# Patient Record
Sex: FEMALE | Race: WHITE
[De-identification: names, ages, dates, MRNs, and addresses within clinical notes are randomized per-mention and may not be internally consistent; named-entity substitution may affect disease eponyms.]

---

## 2020-06-28 ENCOUNTER — HOSPITAL ENCOUNTER (EMERGENCY)
Dept: HOSPITAL 46 - ED | Age: 25
Discharge: HOME | End: 2020-06-28
Payer: COMMERCIAL

## 2020-06-28 VITALS — BODY MASS INDEX: 18.33 KG/M2 | WEIGHT: 110.01 LBS | HEIGHT: 65 IN

## 2020-06-28 DIAGNOSIS — J45.909: ICD-10-CM

## 2020-06-28 DIAGNOSIS — F17.200: ICD-10-CM

## 2020-06-28 DIAGNOSIS — N39.0: Primary | ICD-10-CM

## 2020-06-28 DIAGNOSIS — Z91.018: ICD-10-CM

## 2020-06-30 ENCOUNTER — HOSPITAL ENCOUNTER (OUTPATIENT)
Dept: HOSPITAL 46 - ED | Age: 25
Setting detail: OBSERVATION
LOS: 2 days | Discharge: HOME | End: 2020-07-02
Attending: SURGERY | Admitting: SURGERY
Payer: COMMERCIAL

## 2020-06-30 VITALS — BODY MASS INDEX: 22.91 KG/M2 | WEIGHT: 137.5 LBS | HEIGHT: 65 IN

## 2020-06-30 DIAGNOSIS — F17.210: ICD-10-CM

## 2020-06-30 DIAGNOSIS — K35.33: Primary | ICD-10-CM

## 2020-06-30 DIAGNOSIS — Z91.018: ICD-10-CM

## 2020-06-30 PROCEDURE — U0002 COVID-19 LAB TEST NON-CDC: HCPCS

## 2020-06-30 PROCEDURE — G0378 HOSPITAL OBSERVATION PER HR: HCPCS

## 2020-06-30 PROCEDURE — C9803 HOPD COVID-19 SPEC COLLECT: HCPCS

## 2020-06-30 NOTE — XMS
PreManage Notification: LITTLE KING MRN:[id number]
 
Security Information
 
Security Events
No recent Security Events currently on file
 
 
 
CRITERIA MET
------------
- [facility] - 2 Visits in 30 Days
 
 
CARE PROVIDERS
There are no care providers on record at this time.
 
Reed has no Care Guidelines for this patient.
 
AUGUSTINA VISIT COUNT (12 MO.)
-------------------------------------------------------------------------------------
2 [facility]
-------------------------------------------------------------------------------------
TOTAL 2
-------------------------------------------------------------------------------------
NOTE: Visits indicate total known visits.
 
ED/C VISIT TRACKING (12 MO.)
-------------------------------------------------------------------------------------
06/30/2020 22:27
[facility]
 
TYPE: Emergency
 
COMPLAINT:
- ABD PAIN
-------------------------------------------------------------------------------------
06/28/2020 01:25
[facility]
 
TYPE: Emergency
 
COMPLAINT:
- ABD PAIN
-------------------------------------------------------------------------------------
 
 
INPATIENT VISIT TRACKING (12 MO.)
No inpatient visits to display in this time frame
 
[email or web address]

## 2020-07-01 PROCEDURE — 0DTJ4ZZ RESECTION OF APPENDIX, PERCUTANEOUS ENDOSCOPIC APPROACH: ICD-10-PCS | Performed by: SURGERY

## 2020-07-01 NOTE — NUR
IN ROOM FOR REPORT, PT IN 8/10 PAIN. ADMINISTERING 1.5MG IV DILAUDID. PT
DENIES FURTHER NEEDS. CALL LIGHT IS CLOSE.

## 2020-07-01 NOTE — NUR
HELPED PT TO THE BATHROOM AND BACK TO BED. SCD'S PUT ON AND TURNED ON. PT
ASKED FOR SOMETHING TO EAT. I GAVE HER SOME PUDDING. SHE ALSO ASKED FOR PAIN
MEDS. I INFORMED HER RN GERRI. PT NEEDS NOTHING MORE AT THIS TIME.

## 2020-07-01 NOTE — NUR
PT ARRIVED TO THE FLOOR VIA STRETCHER. SHE WAS PULLED OVER TO BED 3PA. SHE IS
TUCKED INTO BED VS TAKEN. ELIAS IS IN THE ROOM DOING THE ADMISSION HX.

## 2020-07-01 NOTE — NUR
PT STATES PAIN IS 9/10 IN ABD. PRN PAIN MED PROVIDED. NO OTHER NEEDS AT THIS
TIME. CPOX 97% RA, PULSE 53. IV FLUIDS INFSUING PER ORDER. CALL LIGHT IN
REACH.

## 2020-07-01 NOTE — NUR
PT IS RESTING WITH EYES CLOSED, RR IS EVEN AND NONLABORED. IV IS INFUSING
FINE. CALL LIGHT IS CLOSE.

## 2020-07-01 NOTE — NUR
PT APPEARS TO BE RESTING AT THIS TIME COMFORTABLY. RESPIRATIONS NOTED AND PT
IS ON CPOX AT THIS TIME

## 2020-07-01 NOTE — NUR
PT HAVING EMESIS AND RATING PAIN 8/10. CALLED DR KAUR GOT NEW ORDERS TORBC
FOR DILAUDID/PHENERGAN. NO FURTHER CHANGES.

## 2020-07-01 NOTE — NUR
PT IV IS A FIELD START THAT IS  BUT FLUSHED WELL, CDI AND WNL.  CALLED
MD ON PHONE TO INFORM HIM SHE HAS HAD 6 IV ATTEMPTS IN THE LAST DAY TO TRY TO
REPLACE THE  IV WITHOUT SUCCESS AND ASK IF IT IS OK TO LEAVE AND USE
THE IV IN PLACE. MD AUTHORIZED LEAVING THE  FIELD START AND USE THE IV
AS NEEDED. ORDER REPEATED BACK.

## 2020-07-01 NOTE — NUR
SHIFT REPORT RECEIVED FROM FRANK SALAS. PT RESTING IN BED, EYES CLOSED. CALL
LIGHT IN REACH. IV FLUIDS INFUSING PER ORDER.

## 2020-07-01 NOTE — OR
[facility]
                                    [address], [place]  [address]
_________________________________________________________________________________________
                                                                 Signed   
 
 
DATE OF OPERATION:
07/01/2020
 
SURGEON:
Jelena Garcia MD
 
PREOPERATIVE DIAGNOSIS:
Acute appendicitis.
 
POSTOPERATIVE DIAGNOSIS:
Acute inflamed appendicitis.
 
PROCEDURE:
Laparoscopic appendectomy.
 
ESTIMATED BLOOD LOSS:
None.
 
INDICATIONS:
Ramandeep is a 25-year-old young lady, who is otherwise generally healthy.  She had
developed right lower quadrant abdominal pain and nausea.  She had been in the emergency
room a couple of days ago and initially, her evaluation including a CT scan was
negative, but that pain became more localized to the right lower quadrant.  She also had
a urinary tract infection.  Unfortunately, she did not stop by the pharmacy to 
her Macrobid.  She returns now with more localized right lower quadrant abdominal pain.
Repeat CT scan showed an inflamed appendix.  Of course, the urinalysis still contains
bacteria and nitrites.  The original culture from two days ago grew out E. coli.  It is
sensitive to the Macrobid.  It is also sensitive to the Rocephin that we gave her in the
emergency room.  In addition, she did receive some Flagyl.  I have been asked to admit
her as a general surgeon on-call.  She was hydrated overnight and this morning I had
reviewed her findings with her in detail.  We discussed the location of function of the
appendix.  We discussed laparoscopic versus an open appendectomy.  She understands
expected intraop and postop course.  There is risk including, but not limited to
bleeding, infection, scarring, change in contour of the skin, damage to bowel,
appendiceal stump leak, postoperative intraabdominal abscess, incisional hernias and
other unforeseen comorbidities.  She had expressed understanding and wished to proceed. 
 
DESCRIPTION OF PROCEDURE:
Ramandeep was taken in the operating room and placed in the supine position under general
endotracheal tube anesthesia.  She was given preoperative antibiotics along with
subcutaneous heparin.  SCDs were utilized.  She had urinated just prior to going to the
operating room.  Consequently, we did not place a Mendez catheter.  She was prepped and
 
    Electronically Signed By: JELENA GARCIA MD  07/01/20 1301
_________________________________________________________________________________________
PATIENT NAME:     RAMANDEEP KING                       
MEDICAL RECORD #: [id number]            OPERATIVE REPORT              
          ACCT #: [id number]  
DATE OF BIRTH:   [birth year]            REPORT #: [id number]      
PHYSICIAN:        JELENA GARCIA MD             
PCP:              OTHER PCP                     
REPORT IS CONFIDENTIAL AND NOT TO BE RELEASED WITHOUT AUTHORIZATION
 
 
                                  [address]  [address]
_________________________________________________________________________________________
                                                                 Signed   
 
 
draped in the usual sterile fashion.  All trocars were placed in usual positions under
direct visualization of camera without difficulty.  Pictures were taken throughout for
photodocumentation.  The appendix was elevated and divided from the base of the cecum
with the help of the linear stapler.  The vascular load was then used to divide the
mesoappendix.  Hemostasis was excellent on both staple lines.  The appendix was then
placed into an EndoCatch bag and taken out through the right subcostal trocar site.  We
used our laparoscopic suturing device to pass 0 Vicryl suture on either side of the
fascia of the subxiphoid trocar site.  This was closed __________ fascia primarily.
After this, all the gas was allowed to escape and all the remaining trocars were
removed.  We closed the fascia of the supraumbilical trocar site with interrupted
figure-of-eight and simple 0 Vicryl sutures.  Local anesthetic was copiously injected
into all 3 trocar sites.  Each trocar site was irrigated and suctioned out until clear.
The skin and dermis of each trocar site were closed with interrupted 2-0 subcuticular
Monocryl sutures.  Dry gauze and tape were then applied to all incisions.  Ramandeep was
awakened from her anesthesia, extubated in the OR, and taken to the recovery room in
stable condition. 
 
 
 
            ________________________________________
            Jelena Garcia MD 
 
 
ALB/MODL
Job #:  [id number]
DD:  07/01/2020 10:40:29
DT:  07/01/2020 11:39:36
 
cc:            Jelena Garcia MD
 
 
Copies:  JELENA GARCIA MD
~
 
 
 
 
 
 
 
 
 
 
    Electronically Signed By: JELENA GARCIA MD  07/01/20 1301
_________________________________________________________________________________________
PATIENT NAME:     RAMANDEEP KING                       
MEDICAL RECORD #: [id number]            OPERATIVE REPORT              
          ACCT #: [id number]  
DATE OF BIRTH:   [birth year]            REPORT #: [id number]      
PHYSICIAN:        JELENA GARCIA MD             
PCP:              OTHER PCP                     
REPORT IS CONFIDENTIAL AND NOT TO BE RELEASED WITHOUT AUTHORIZATION

## 2020-07-01 NOTE — NUR
PT IS RESTING WITH EYES CLOSED, RR IS EVEN AND NONLABORED ON RA AT 98% SPO2.
IV IS INFUSING FINE. CALL LIGHT IS CLOSE.

## 2020-07-01 NOTE — NUR
RECEIVED REPORT FROM ANTON SALAS. PT HAD NOTIFIED LUCILA KEYS THAT SHE WAS IN
PAIN. SONIA RN AND CELESTE SALAS IN ROOM TO GIVE PT 1.5MG OF DILAUDID. SINTA CNA
IN ROOM TO ASSIST PT TO HAVE WIPE DOWN.
RADHA IN ROOM TO ASSIST PT TO SIGN UP FOR INSURANCE

## 2020-07-01 NOTE — NUR
IN ROOM TO ADMINISTER DILAUDID FOR PAIN 8/10. REVIEWED PREOP CHECKLIST WITH
PT. WILL TRY TO START A 2ND IV, PT STATES THEY USUALLY HAVE A HARD TIME
STARTING IV'S ON HER.

## 2020-07-01 NOTE — NUR
PATIENT CURLED UP ON LEFT SIDE IN BED. VITALS CHARTED BY JOSUE MARIE. I&OS DONE.
GARBAGE EMPTIED. CALL LIGHT IN REACH

## 2020-07-01 NOTE — NUR
pt arrived to the floor at 1130. pt has 3 lap sites that are clean dry and
intact. pt does not complain of pain and is slightly drowsy but arousable.
call light is within reach and pt was placed on cpox, pt o2 sat at 95% on room
air

## 2020-07-01 NOTE — NUR
07/01/20 1058 Nga Adame 1038 PT ARRIVED IN PACU SLEEPY WITH NO C/O'S. ICE TO ABD. 1045 PT
REPOSITIONED SELF TO L SIDE. SHIVERING. WARM BLANKETS PLACED ON PT.

## 2020-07-01 NOTE — NUR
PT WAS ADMITTED 0105 TODAY WITH HOLDING ORDERS FROM E.R. PT WAS IN ALOT OF
PAIN, CALLED DR KAUR FOR MORE PAIN CONTROL AND NAUSEA MEDS. PT SINCE HAD A
DOSE OF DILADID AND HAS SLEPT WELL SINCE THEN. SHE WILL BE EVALUATED BY DR KAUR TODAY FOR SURGERY. SHE IS NPO AND HAS LR RUNNING AT 125MLS/HR.

## 2020-07-01 NOTE — NUR
1200 PT PUT ON CALL LIGHT TO LET STAFF KNOW THAT SHE WAS IN A LOT OF PAIN.
THIS RN ASSISTED PT TO COMMODE. PT ABLE TO URINATE AND GET BACK TO BED WITH
BRENNA GRIMACING FROM PAIN. THIS RN DISCUSSED WITH PT WHAT MEDS SHE HAS
AVAILABLE.
THIS RN WENT TO GRAB PT PAIN MEDS. PT BACK TO SLEEP WITH RESPIRATIONS NOTED.
THIS RN ABLE TO TAKE PTS BP AND START FLUIDS ON PT WITHOUT WAKING PT.
THIS RN WILL RETURN TO ROOM TO GIVE MEDS WHEN PT WAKES UP AGAIN AND REQUESTS
PAIN MEDS AGAIN I

## 2020-07-01 NOTE — NUR
pt USED CALL LIGHT TO ASK FOR ASSISTANCE INTO RESTROOM. VS AND I&OS COMPLETE.
NOTHING FURTHER NEEDED AT THIS TIME.

## 2020-07-01 NOTE — NUR
ASSESSMENT, VS AND I&O COMPLETED. PT PAIN 10/10 PRN PAIN MED PROVIDED. 3 LAP
SITES CDI, WNL, COVERED WITH GAUZE. BOWEL TONES ACTIVE. PT TOLERATING LIQUIDS.
LUNGS CLEAR. IV WNL, CDI, FLUSHED MWELL. IV FLUIDS INFUSING PER ORDER. CMS
INTACT. CPOX 96% RA, PULSE 51. NO OTHER NEEDS AT THIS TIME. CALL LIGHT IN RE
REACH.

## 2020-07-01 NOTE — CONS
[facility]
                                    [address], [place]  [address]
_________________________________________________________________________________________
                                                                 Signed   
 
 
DATE OF CONSULTATION:
07/01/2020
 
REFERRING PHYSICIAN:
Dr. Masoud Harry.
 
CHIEF COMPLAINT:
Right lower quadrant abdominal pain.
 
HISTORY OF PRESENT ILLNESS:
Ramandeep is a 25-year-old female, otherwise healthy, who tells me she outgrew her
childhood asthma.  She came to our emergency room a couple of days ago with abdominal
pain.  This workup was negative including a CT scan.  There was concern for urinary
tract infection.  Macrobid was sent into local [facility] pharmacy.  She and her boyfriend
never picked up the prescription.  She is currently unemployed I believe and she told me
her boyfriend is also unemployed.  She told me she is originally from [place], [place]
but had lived in [place], [place] for a while with her grandfather, Jermaine Fontaine.  She
told me she does drive.  She also has two older siblings.  Her abdominal pain has gotten
worse and it is now localized to the right lower quadrant.  She came back to the
emergency room for evaluation.  White count was elevated and her urine specific gravity
was elevated with bacteria and nitrates in the urine.  Beta-hCG was negative.  Urine
culture still pending.  A repeat CT scan showed an inflamed appendix.  Consequently, she
was admitted overnight and started on Rocephin and Flagyl on IV fluids.  This morning,
she was sleeping, but easily awaken. 
 
PAST MEDICAL HISTORY:
Ovarian cyst and asthma as a child.
 
PAST SURGICAL HISTORY:
None.
 
SOCIAL HISTORY:
She smokes a pack of cigarettes a day.  She does not drink.  Apparently lives with her
boyfriend.  She drives, but is unemployed and her boyfriend is unemployed.  Her
grandfather is Jermaine Fontaine at [phone] in [place], [place].  She told me she is
estranged from her family including her mom and dad.  She prefers the [facility] pharmacy
and has two older siblings, one brother, one sister. 
 
FAMILY HISTORY:
She told me no knowledge of any significant medical history in the family.
 
REVIEW OF SYSTEMS:
She had 10 systems reviewed and there has been no other issues including the fractures
and/or metal in her body. 
 
    Electronically Signed By: JELENA KAUR MD  07/01/20 1046
_________________________________________________________________________________________
PATIENT NAME:     RAMANDEEP KING                       
MEDICAL RECORD #: [id number]            CONSULTATION                  
          ACCT #: [id number]  
DATE OF BIRTH:   [birth year]            REPORT #: [id number]      
PHYSICIAN:        JELENA KAUR MD             
PCP:              OTHER PCP                     
REPORT IS CONFIDENTIAL AND NOT TO BE RELEASED WITHOUT AUTHORIZATION
 
 
                                  [facility]
                                    [address] [place] [address]  [address]
_________________________________________________________________________________________
                                                                 Signed   
 
 
 
ALLERGIES:
Almonds.
 
MEDICATIONS:
Macrobid 100 mg p.o. b.i.d. was ordered, but she is yet to pick that up from Health system
pharmacy. 
 
PHYSICAL EXAMINATION:
VITAL SIGNS:  Her blood pressure is 121/71, heart rate 57, respiratory rate 18,
temperature is 98.7.  She is 100% on room air.  She is 5 feet 5 inches and 62 kg. 
GENERAL:  Ramandeep is a 25-year-old female, who was sleeping in the left lateral decubitus
position.  She was easily awakened. 
LUNGS:  Clear to auscultation bilaterally. 
HEART:  Regular rate and rhythm. 
ABDOMEN:  Generally soft and flat, but she is tender in the right lower quadrant.
 
LABORATORY DATA:
Her white blood count 16.3, hemoglobin 13, neutrophils 83, BUN 8, creatinine 0.46.
Urine specific gravity was elevated at 1.040 with lots of bacteria and nitrites.  Urine
culture is pending and we will check the urine culture from the ER two days ago.  Liver
function tests are negative.  Beta-hCG is negative.  Albumin is 4.1.  Coronavirus is
pending. 
 
RADIOGRAPHIC STUDIES:
A CT scan of abdomen and pelvis is reviewed and she has an inflamed appendix in the
right lower quadrant, although not particularly dilated at 5-6 mm. 
 
ASSESSMENT AND PLAN:
Ramandeep is a 25-year-old female, who has been admitted with what appears to be
appendicitis as well as a urinary tract infection and dehydration.  She has been given
IV fluids, antibiotics, and pain control overnight.  We have a short surgery ahead over
and she will be next in line to get her appendix removed.  We discussed the location and
function of the appendix.  We discussed laparoscopic versus open appendectomy.  We
reviewed the expected intraop and postop course.  We did review the risks including, but
not limited to bleeding, infection, scarring, change in contour of the skin, damage to
bowel, appendiceal stump leak, intraabdominal postoperative abscess, incisional hernias
and other unforeseen comorbidities.  She has expressed understanding, would like to
proceed. 
 
 
 
            ________________________________________
            Jelena Kaur MD 
 
    Electronically Signed By: JELENA KAUR MD  07/01/20 1046
_________________________________________________________________________________________
PATIENT NAME:     RAMANDEEP KING                       
MEDICAL RECORD #: [id number]            CONSULTATION                  
          ACCT #: [id number]  
DATE OF BIRTH:   [birth year]            REPORT #: [id number]      
PHYSICIAN:        JELENA KAUR MD             
PCP:              OTHER PCP                     
REPORT IS CONFIDENTIAL AND NOT TO BE RELEASED WITHOUT AUTHORIZATION
 
 
                                  [facility]
                                    [address] [facility]
                                  [place], [place]  [address]
_________________________________________________________________________________________
                                                                 Signed   
 
 
 
 
ALB/MODL
Job #:  [id number]
DD:  07/01/2020 07:11:35
DT:  07/01/2020 07:59:26
 
cc:            Jelena Kaur MD
 
 
Copies:  JELENA KAUR MD
~
 
 
 
 
 
 
 
 
 
 
 
 
 
 
 
 
 
 
 
 
 
 
 
 
 
 
 
 
 
 
 
 
 
    Electronically Signed By: JELENA KAUR MD  07/01/20 1046
_________________________________________________________________________________________
PATIENT NAME:     RAMANDEEP KING                       
MEDICAL RECORD #: [id number]            CONSULTATION                  
          ACCT #: [id number]  
DATE OF BIRTH:   [birth year]            REPORT #: [id number]      
PHYSICIAN:        JELENA KAUR MD             
PCP:              OTHER PCP                     
REPORT IS CONFIDENTIAL AND NOT TO BE RELEASED WITHOUT AUTHORIZATION

## 2020-07-01 NOTE — NUR
PT HAS IV FIELD START. THIS RN MADE 2 UNSUCESSFUL IV ATTEMPTS. PT DENIES NEEDS
AT THIS TIME. CALL LIGHT IS CLOSE.

## 2020-07-01 NOTE — NUR
PT REQUESTING MORE PAIN MEDS AT THIS TIME. PAIN MEDS NOT AVAILABLE AT THIS
TIME. CALL LIGHT IS WITHIN REACH. PT AGREEABLE TO WAIT A BIT MORE TIME FOR
PAIN MEDS.
PTS BOYFRIEND IN ROOM AT THIS TIME

## 2020-07-02 NOTE — NUR
PATIENT IS STILL IN BED. SHE SAID SHE WOULD TAKE A SHOWER AND AMBULATE IN THE
KING AFTER SHE EATS HER BREAKFAST. ALSO GOT HER SHOWER IS SET UP.

## 2020-07-02 NOTE — PATH
[facility]
                                    [address], [place]  [address]
_________________________________________________________________________________________
                                                                 Signed   
 
 
 
SPECIMEN(S): A APPENDIX
 
SPECIMEN SOURCE:
A. APPENDIX
 
CLINICAL HISTORY:
Acute appendicitis.
 
FINAL PATHOLOGIC DIAGNOSIS:
Appendix, appendectomy:
-  Acute appendicitis, periappendicitis and serositis.
-  Focal mucosal erosion.
-  Negative for atypical features.
JVR:cml:C2NR
 
MICROSCOPIC EXAMINATION:
Histologic sections of all submitted blocks are examined by light microscopy.  
These findings, together with the gross examination, support the pathologic 
diagnosis. 
 
GROSS DESCRIPTION:
The specimen, labeled "SD," and designated on the requisition "appendix," is 
received in formalin and consists of 
Specimen: Appendix with mesoappendix.
Dimensions: 5.6 x 0.8 cm.
Serosa: Tan and rough.
Perforation: Not grossly identified.
Inking: Staple line is inked black.
Mucosa: Thickened, tan with hemorrhagic lumen.
Fecalith: Not grossly identified.
Additional: None.
Representative sections are submitted in cassette (A1).
AT (under the direct supervision of a pathologist)
The Gross Description was prepared using a voice recognition system.  The 
report was reviewed for accuracy; however, sound-alike word errors, addition 
and/or deletions may occur.  If there is any 
question about this report, please contact Client Services.
 
PERFORMING LABORATORY:
The technical component was performed by "[facility] [address] [place] [id number] (Medical Director: Marah Tomas MD; CLIA# [id number]). 
 
                                                                                    
_________________________________________________________________________________________
PATIENT NAME:     FERNANDOLITTLE NICOLAS                       
MEDICAL RECORD #: [id number]            PATHOLOGY                     
          ACCT #: [id number]       ACCESSION #: [id number]     
DATE OF BIRTH:   [birth year]            REPORT #: [id number]       
PHYSICIAN:        LAURI PATHOLOGY              
PCP:              OTHER PCP                     
REPORT IS CONFIDENTIAL AND NOT TO BE RELEASED WITHOUT AUTHORIZATION
 
 
                                  [place] [address] [facility]
                                  [place], [place]  [address]
_________________________________________________________________________________________
                                                                 Signed   
 
 
Professional interpretation was performed by 
"[facility] [facility] [place] [address] (Medical Director:  Estevan Umana M.D.). 
 
Diagnostician:  Estevan Umana MD
Pathologist
Electronically Signed 07/02/2020
 
 
Copies:                                
~
 
 
 
 
 
 
 
 
 
 
 
 
 
 
 
 
 
 
 
 
 
 
 
 
 
 
 
 
 
 
 
 
                                                                                    
_________________________________________________________________________________________
PATIENT NAME:     LITTLE KING FIDENCIO                       
MEDICAL RECORD #: [id number]            PATHOLOGY                     
          ACCT #: [id number]       ACCESSION #: [id number]     
DATE OF BIRTH:   [birth year]            REPORT #: [id number]       
PHYSICIAN:        LAURI PATHOLOGY              
PCP:              OTHER PCP                     
REPORT IS CONFIDENTIAL AND NOT TO BE RELEASED WITHOUT AUTHORIZATION

## 2020-07-02 NOTE — NUR
UP AMBULATING IN HALLWAY WITH FAMILY MEMBER. INFORMED OF NEED TO AMBULATE AND
SHOWER. IV CONVERTED TO SALINE LOCK FOR PATIENT TO AMBULATE. TOLERATING FOOD
AND LIQUIDS WELL. PAIN CONTROLLED WITH PO MEDICATION THIS AM. INFORMED IF SHE
CONTINUES TO DO WELL THIS AFTERNOON, WE WILL DISCUSS HER GOING HOME.
VERBALIZES UNDERSTANDING.

## 2020-07-02 NOTE — NUR
PT RESTING IN BED, WATCHING TV. PAIN 2/10, NO NEED FOR INTERVENTION.
ASSESSMNET COMPLETED. INCISIONS CDI, WNL. IV WNL, CDI. BOWEL TONES ACTIVE.
LUNGS CLEAR. NO OTHER NEEDS. CALL LIGHT IN REACH.

## 2020-07-02 NOTE — NUR
PT STATES SHE HAS 6/10 PAIN, PRN PAIN MED PROVIDED. IV WNL, FLUSHED WELL. IV
FLUIDS INFUSING PER ORDER. PT EATING JELLO, TOLERATING WELL. NO OTHER NEEDS.
CALL LIGHT IN REACH.

## 2020-07-02 NOTE — NUR
SPOKE WITH PATIENT IN ROOM.  HER GRANDFATHER IS IN THE ROOM.  ASKED IF I CAN
TALK WITH HER IN FRONT OF HIM, EVEN ABOUT DELICATE ISSUES.  SHE STATES "YES,
HE KNOWS ALL ABOUT MY ISSUES".  PATIENT LIVES INBETWEEN HOMES OF FAMILY.  HAS
A BOYFRIEND.  SHE IS NOT WORKING.  SHE CAN DRIVE, BUT DOESN'T HAVE A CAR.
USES NO DME.  HAS NO AMBULATION ISSUES.  DISCUSSED HER SCREEN THAT SHE HAS
BEEN USING MULTIPLE ILLEGAL STREET SUBSTANCES.  OFFERED PEER TO PEER SCREEN
FROM [facility] [employer].  PATIENT STATES SHE IS INTERESTED IN
THIS.  STATES SHE WANTS TO GET "CLEAN".  ASKED IF IT WAS OK FOR ME TO CALL
THEM AND SET UP FOR SOMEONE TO COME SEE HER.  EXPLAINED SHE IS UNDER NO
OBLIGATION, IT IS STRICTLY TO GIVE HER RESOURCES AND IF SHE WANTS TO WORK WITH
THEM SHE CAN MAKE A PLAN WITH THEM FOR AFTER DISCHARGE.  SHE IS AGREEABLE TO
THIS.

## 2020-07-02 NOTE — NUR
PATIENT RESTING IN BED, CALL LIGHT IN REACH. FAMILY SLEEPING IN BEDSIDE
RECILNER. PATIENT AWAKES BRIEFLY FOR VITAL SIGNS BUT OTHERWISE REMAINS ASLEEP.
LIGHTER FOUND IN PATIENT'S LINEN, PATIENT APPEARS TO HAVE DARKENED LEFT EYE.
RN NOTIFIED. NO FURTHER NEEDS AT THIS TIME.

## 2020-07-02 NOTE — NUR
PT SLEPT VERY LITTLE THIS SHIFT. PAIN MANAGED WITH PRN PAIN MEDS. INCISIONS
WNL, CDI, COVERED. ABD MILDLY DISTENDED, BOWEL TONES ACTIVE. UOS, VSS. IV WNL.
LUNG SOUNDS CLEAR. PT TOLERATED DIET WELL. PT HAD MILD NAUSEA MANAGED BY PRN
MED.

## 2020-07-02 NOTE — NUR
Received report from JT Storm RN. Patient lying in bed with eyes closed.
Family member at bedside. No signs of pain or discomfort noted at this time.
call light in reach, bed rails up X2.

## 2020-07-02 NOTE — NUR
ASSESSMENT COMPLETED. AM MEDICATIONS GIVEN. TAKES WITHOUT DIFFICULTY.
DRESSINGS FROM INCISION SITES REMOVED PER VERBAL ORDER FROM DR. KAUR. IV
FLUIDS DECREASED PER ORDER. PRN ANALGESIC GIVEN PER PATIENT REQUEST.
ENCOURAGED TO ORDER BREAKFAST, MENU AND INSTRUCTIONS GIVEN FOR ORDERING MEALS.
VERBALIZES UNDERSTANDING. INFORMED SHE SHOULD SHOWER AND AMBULATE IN HALLWAYS
THIS MORNING. DENIES OTHER NEEDS. CALL LIGHT IN REACH, FAMILY MEMBER IN ROOM.

## 2020-07-02 NOTE — NUR
CALLED [facility] [phone].  SPOKE WITH KENNETH.  DISCUSSED THE
SITUATION.  SHE STATES SHE WILL COME SEE PATIENT WITHIN THE NEXT COUPLE
HOURS.  EXPLAINED PATIENT MAY BE DISCHARGING LATER TODAY.

## 2020-07-02 NOTE — NUR
PT STATES HER PAIN IS 9/10 AND HAS NAUSEA. PRN PAIN AND NAUSEA MEDS PROVIDED.
PT ASKS ABOUT GOING OUTSIDE TO SMOKE. INFORMED HER AND BOYFRIEND THAT IS IN
ROOM THAT WE ARE A TOBACCO FREE CAMPUS. SHE SAYS "OK". NO OTHER NEEDS AT THIS
TIME. CALL LIGHT IN REACH.

## 2020-07-02 NOTE — NUR
PT AWAKE IN ROOM. PT EDUCATED ON THE NEED TO REST AND TO HAVE VISITORS LET HER
REST. VS AND I&O COMPLETED. IV INFUSING PER ORDER. NO OTHER NEEDS AT THIS
TIME. PAIN 4/10, ICE PACK PROVIDED. CALL LIGHT IN REACH.

## 2020-07-02 NOTE — NUR
INFORMED DR. KAUR OF PATIENT ABILITY TO EAT, DRINK, AMBULATE AND PAIN
CONTROLLED WITH PO ANALGESICS. ORDERS TO DC PATIENT TO HOME.

## 2020-07-02 NOTE — NUR
PT AWAKE IN ROOM, WATCHING TV. PAIN 2/10. BROTH PROVIDED. NO OTHER NEEDS AT
THIS TIME. CALL LIGHT IN REACH.

## 2021-03-16 ENCOUNTER — HOSPITAL ENCOUNTER (EMERGENCY)
Dept: HOSPITAL 46 - ED | Age: 26
Discharge: HOME | End: 2021-03-16
Payer: COMMERCIAL

## 2021-03-16 VITALS — WEIGHT: 137.5 LBS | BODY MASS INDEX: 22.91 KG/M2 | HEIGHT: 65 IN

## 2021-03-16 DIAGNOSIS — J45.909: ICD-10-CM

## 2021-03-16 DIAGNOSIS — Z91.018: ICD-10-CM

## 2021-03-16 DIAGNOSIS — K04.7: Primary | ICD-10-CM

## 2021-03-16 DIAGNOSIS — F17.200: ICD-10-CM

## 2022-08-18 ENCOUNTER — HOSPITAL ENCOUNTER (EMERGENCY)
Dept: HOSPITAL 46 - ED | Age: 27
Discharge: HOME | End: 2022-08-18
Payer: COMMERCIAL

## 2022-08-18 VITALS — HEIGHT: 65 IN | BODY MASS INDEX: 20.83 KG/M2 | WEIGHT: 125 LBS
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
## 2022-08-18 DIAGNOSIS — F17.200: ICD-10-CM

## 2022-08-18 DIAGNOSIS — J45.909: ICD-10-CM

## 2022-08-18 DIAGNOSIS — Z91.018: ICD-10-CM

## 2022-08-18 DIAGNOSIS — S96.912A: Primary | ICD-10-CM

## 2022-08-18 PROCEDURE — A9270 NON-COVERED ITEM OR SERVICE: HCPCS

## 2022-12-17 ENCOUNTER — HOSPITAL ENCOUNTER (EMERGENCY)
Dept: HOSPITAL 46 - ED | Age: 27
Discharge: LEFT BEFORE DISCHARGE | End: 2022-12-17
Payer: COMMERCIAL

## 2022-12-17 VITALS — WEIGHT: 125 LBS | HEIGHT: 65 IN | BODY MASS INDEX: 20.83 KG/M2

## 2022-12-17 DIAGNOSIS — F11.23: ICD-10-CM

## 2022-12-17 DIAGNOSIS — Z79.899: ICD-10-CM

## 2022-12-17 DIAGNOSIS — J45.909: ICD-10-CM

## 2022-12-17 DIAGNOSIS — R55: Primary | ICD-10-CM

## 2022-12-17 DIAGNOSIS — F17.200: ICD-10-CM

## 2022-12-17 DIAGNOSIS — Z53.29: ICD-10-CM

## 2022-12-17 DIAGNOSIS — Z91.018: ICD-10-CM

## 2022-12-17 PROCEDURE — U0003 INFECTIOUS AGENT DETECTION BY NUCLEIC ACID (DNA OR RNA); SEVERE ACUTE RESPIRATORY SYNDROME CORONAVIRUS 2 (SARS-COV-2) (CORONAVIRUS DISEASE [COVID-19]), AMPLIFIED PROBE TECHNIQUE, MAKING USE OF HIGH THROUGHPUT TECHNOLOGIES AS DESCRIBED BY CMS-2020-01-R: HCPCS

## 2022-12-17 NOTE — XMS
PreManage Notification: LITTLE KING MRN:[id number]
 
Security Information
 
Security Events
No recent Security Events currently on file
 
 
 
CRITERIA MET
------------
- [facility] - 2 Visits in 30 Days
 
 
CARE PROVIDERS
There are no care providers on record at this time.
 
[facility] has no Care Guidelines for this patient.
 
AUGUSTINA VISIT COUNT (12 MO.)
-------------------------------------------------------------------------------------
1 [address] Anthony Micah
-------------------------------------------------------------------------------------
TOTAL 3
-------------------------------------------------------------------------------------
NOTE: Visits indicate total known visits.
 
ED/C VISIT TRACKING (12 MO.)
-------------------------------------------------------------------------------------
12[facility]17/2022 07:07
[facility] [place]
 
TYPE: Emergency
 
COMPLAINT:
- SUBSTANCE ABUSE
-------------------------------------------------------------------------------------
12/03/2022 20:00
[facility] [place]
 
TYPE: Emergency
 
DIAGNOSES:
- Cystitis, unspecified with hematuria
- POSS UTI
-------------------------------------------------------------------------------------
08/18/2022 15:06
[facility]
 
TYPE: [facility]
 
COMPLAINT:
- L FOOT PAIN
 
DIAGNOSES:
- Strain of unspecified muscle and tendon at ankle and foot level, left foot, initial
encounter
 
- Fall (on) (from) unspecified stairs and steps, initial encounter
- Unspecified asthma, uncomplicated
- Pain in left ankle and joints of left foot
- Allergy to other foods
- Nicotine dependence, unspecified, uncomplicated
-------------------------------------------------------------------------------------
 
 
INPATIENT VISIT TRACKING (12 MO.)
No inpatient visits to display in this time frame
 
[email or web address]

## 2022-12-17 NOTE — EKG
[facility]
                                    [address]
                                  [place]  [address]
_________________________________________________________________________________________
                                                                 Signed   
 
 
Sinus bradycardia with sinus arrhythmia
Rightward axis
Borderline ECG
No previous ECGs available
Confirmed by HEAVEN TRAYLOR MD ([id number]) on 12/17/2022 7:02:21 PM
 
 
 
 
 
 
 
 
 
 
 
 
 
 
 
 
 
 
 
 
 
 
 
 
 
 
 
 
 
 
 
 
 
 
 
 
 
 
 
 
    Electronically Signed By: HEAVEN TRAYLOR MD  12/17/22 1902
_________________________________________________________________________________________
PATIENT NAME:     LITTLE KING FIDENCIO                       
MEDICAL RECORD #: [id number]                     Electrocardiogram             
          ACCT #: [id number]  
DATE OF BIRTH:   [birth year]                                       
PHYSICIAN:   HEAVEN TRAYLOR MD                    REPORT #: [id number]
REPORT IS CONFIDENTIAL AND NOT TO BE RELEASED WITHOUT AUTHORIZATION